# Patient Record
Sex: FEMALE | Race: AMERICAN INDIAN OR ALASKA NATIVE
[De-identification: names, ages, dates, MRNs, and addresses within clinical notes are randomized per-mention and may not be internally consistent; named-entity substitution may affect disease eponyms.]

---

## 2019-08-07 ENCOUNTER — HOSPITAL ENCOUNTER (EMERGENCY)
Dept: HOSPITAL 5 - ED | Age: 49
LOS: 1 days | Discharge: HOME | End: 2019-08-08
Payer: SELF-PAY
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
## 2019-08-07 DIAGNOSIS — J45.909: ICD-10-CM
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
## 2019-08-07 DIAGNOSIS — S06.0X9A: Primary | ICD-10-CM

## 2019-08-07 PROCEDURE — 70450 CT HEAD/BRAIN W/O DYE: CPT

## 2019-08-08 VITALS — DIASTOLIC BLOOD PRESSURE: 82 MMHG | SYSTOLIC BLOOD PRESSURE: 120 MMHG
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
